# Patient Record
Sex: FEMALE | Race: WHITE | NOT HISPANIC OR LATINO | URBAN - NONMETROPOLITAN AREA
[De-identification: names, ages, dates, MRNs, and addresses within clinical notes are randomized per-mention and may not be internally consistent; named-entity substitution may affect disease eponyms.]

---

## 2019-03-18 ENCOUNTER — NURSE TRIAGE (OUTPATIENT)
Dept: CALL CENTER | Facility: HOSPITAL | Age: 29
End: 2019-03-18

## 2019-03-19 NOTE — TELEPHONE ENCOUNTER
"    Reason for Disposition  • Toothache present < 24 hours    Additional Information  • Negative: Shock suspected (e.g., cold/pale/clammy skin, too weak to stand, low BP, rapid pulse)  • Negative: [1] Similar pain previously AND [2] it was from \"heart attack\"  • Negative: [1] Similar pain previously AND [2] it was from \"angina\" AND [3] not relieved by nitroglycerin  • Negative: Sounds like a life-threatening emergency to the triager  • Negative: Chest pain  • Negative: Toothache followed tooth injury  • Negative: Toothache or mouth pain after tooth extraction  • Negative: Canker sore (i.e., aphthous ulcer)  • Negative: Tongue is very swollen and tender  • Negative: [1] Face is swollen AND [2] fever  • Negative: Patient sounds very sick or weak to the triager  • Negative: [1] SEVERE pain (e.g., excruciating, unable to do any normal activities) AND [2] not improved 2 hours after pain medicine  • Negative: Face is very swollen  • Negative: Fever  • Negative: [1] Face is swollen AND [2] no fever  • Negative: Toothache present > 24 hours  • Negative: [1] Toothache AND [2] intermittent AND [3] brought on by hot or cold liquids  • Negative: Brown cavity visible in the painful tooth  • Negative: Cracked or chipped tooth  • Negative: Red or yellow lump present at the gum line of the painful tooth  • Negative: Lost crown  • Negative: Lost filling  • Negative: Broken braces wire or end of braces wire is jabbing into gum, cheek, or tongue    Answer Assessment - Initial Assessment Questions  1. LOCATION: \"Which tooth is hurting?\"  (e.g., right-side/left-side, upper/lower, front/back)      Molar (would not say which tooth)  2. ONSET: \"When did the toothache start?\"  (e.g., hours, days)       today  3. SEVERITY: \"How bad is the toothache?\"  (Scale 1-10; mild, moderate or severe)    - MILD (1-3): doesn't interfere with chewing     - MODERATE (4-7): interferes with chewing, interferes with normal activities, awakens from sleep      " "- SEVERE (8-10): unable to eat, unable to do any normal activities, excruciating pain         severe  4. SWELLING: \"Is there any visible swelling of your face?\"      denies  5. OTHER SYMPTOMS: \"Do you have any other symptoms?\" (e.g., fever)      Cold last  6. PREGNANCY: \"Is there any chance you are pregnant?\" \"When was your last menstrual period?\"      denies    Protocols used: TOOTHACHE-ADULT-AH      "

## 2019-04-03 ENCOUNTER — NURSE TRIAGE (OUTPATIENT)
Dept: CALL CENTER | Facility: HOSPITAL | Age: 29
End: 2019-04-03

## 2019-04-04 NOTE — TELEPHONE ENCOUNTER
"Caller states she has sore throat, cough and is also light headed and weak. She is reporting feeling \"very bad\". She states she has attempted everything to feel better and it's just not working. She states she has not way of taking her temperature but she is feeling warm. Advised per guideline.     Reason for Disposition  • Patient sounds very sick or weak to the triager    Additional Information  • Negative: Severe difficulty breathing (e.g., struggling for each breath, speaks in single words, stridor)  • Negative: Sounds like a life-threatening emergency to the triager  • Negative: [1] Diagnosed strep throat AND [2] taking antibiotic AND [3] symptoms continue  • Negative: Throat culture results, call about  • Negative: Productive cough is main symptom  • Negative: Non-productive cough is main symptom  • Negative: Hoarseness is main symptom  • Negative: Runny nose is main symptom  • Negative: [1] Drooling or spitting out saliva (because can't swallow) AND [2] normal breathing  • Negative: Unable to open mouth completely  • Negative: [1] Difficulty breathing AND [2] not severe  • Negative: Fever > 104 F (40 C)  • Negative: [1] Refuses to drink anything AND [2] for > 12 hours  • Negative: [1] Drinking very little AND [2] dehydration suspected (e.g., no urine > 12 hours, very dry mouth, very lightheaded)    Answer Assessment - Initial Assessment Questions  1. ONSET: \"When did the throat start hurting?\" (Hours or days ago)       Last night   2. SEVERITY: \"How bad is the sore throat?\" (Scale 1-10; mild, moderate or severe)    - MILD (1-3):  doesn't interfere with eating or normal activities    - MODERATE (4-7): interferes with eating some solids and normal activities    - SEVERE (8-10):  excruciating pain, interferes with most normal activities    - SEVERE DYSPHAGIA: can't swallow liquids, drooling      Moderate pain 5  3. STREP EXPOSURE: \"Has there been any exposure to strep within the past week?\" If so, ask: \"What " "type of contact occurred?\"        Unsure   4.  VIRAL SYMPTOMS: \"Are there any symptoms of a cold, such as a runny nose, cough, hoarse voice or red eyes?\"       Cough, hoarse voice  5. FEVER: \"Do you have a fever?\" If so, ask: \"What is your temperature, how was it measured, and when did it start?\"      \"I've been feeling warm\".   6. PUS ON THE TONSILS: \"Is there pus on the tonsils in the back of your throat?\"      Not sure   7. OTHER SYMPTOMS: \"Do you have any other symptoms?\" (e.g., difficulty breathing, headache, rash)       Headache, light headed  8. PREGNANCY: \"Is there any chance you are pregnant?\" \"When was your last menstrual period?\"     Denies    Protocols used: SORE THROAT-ADULT-AH      "

## 2019-09-04 ENCOUNTER — NURSE TRIAGE (OUTPATIENT)
Dept: CALL CENTER | Facility: HOSPITAL | Age: 29
End: 2019-09-04

## 2019-09-04 NOTE — TELEPHONE ENCOUNTER
"Abdominal pain    Reason for Disposition  • [1] MILD-MODERATE pain AND [2] constant AND [3] present > 2 hours    Additional Information  • Negative: Shock suspected (e.g., cold/pale/clammy skin, too weak to stand, low BP, rapid pulse)  • Negative: Difficult to awaken or acting confused (e.g., disoriented, slurred speech)  • Negative: Passed out (i.e., lost consciousness, collapsed and was not responding)  • Negative: Sounds like a life-threatening emergency to the triager  • Negative: Chest pain  • Negative: Pain is mainly in upper abdomen  (if needed ask: \"is it mainly above the belly button?\")  • Negative: Followed an abdomen (stomach) injury  • Negative: [1] Abdominal pain AND [2] pregnant < 20 weeks  • Negative: [1] Abdominal pain AND [2] pregnant > 20 weeks  • Negative: [1] Abdominal pain AND [2] postpartum < 1 month since delivery  • Negative: [1] SEVERE pain (e.g., excruciating) AND [2] present > 1 hour  • Negative: [1] SEVERE pain AND [2] age > 60  • Negative: [1] Vomiting AND [2] contains red blood or black (\"coffee ground\") material  (Exception: few red streaks in vomit that only happened once)  • Negative: Blood in bowel movements   (Exception: blood on surface of BM with constipation)  • Negative: Black or tarry bowel movements  (Exception: chronic-unchanged  black-grey bowel movements AND is taking iron pills or Pepto-bismol)  • Negative: Patient sounds very sick or weak to the triager    Answer Assessment - Initial Assessment Questions  1. LOCATION: \"Where does it hurt?\"       Across  The middle of lower stomach  2. RADIATION: \"Does the pain shoot anywhere else?\" (e.g., chest, back)      no  3. ONSET: \"When did the pain begin?\" (e.g., minutes, hours or days ago)       3 days   4. SUDDEN: \"Gradual or sudden onset?\"      sudden  5. PATTERN \"Does the pain come and go, or is it constant?\"     - If constant: \"Is it getting better, staying the same, or worsening?\"       (Note: Constant means the pain never " "goes away completely; most serious pain is constant and it progresses)      - If intermittent: \"How long does it last?\" \"Do you have pain now?\"      (Note: Intermittent means the pain goes away completely between bouts)       Gets better with bowel movements  6. SEVERITY: \"How bad is the pain?\"  (e.g., Scale 1-10; mild, moderate, or severe)    - MILD (1-3): doesn't interfere with normal activities, abdomen soft and not tender to touch     - MODERATE (4-7): interferes with normal activities or awakens from sleep, tender to touch     - SEVERE (8-10): excruciating pain, doubled over, unable to do any normal activities       5  7. RECURRENT SYMPTOM: \"Have you ever had this type of abdominal pain before?\" If so, ask: \"When was the last time?\" and \"What happened that time?\"       no  8. CAUSE: \"What do you think is causing the abdominal pain?\"       stress  9. RELIEVING/AGGRAVATING FACTORS: \"What makes it better or worse?\" (e.g., movement, antacids, bowel movement)      nothing  10. OTHER SYMPTOMS: \"Has there been any vomiting, diarrhea, constipation, or urine problems?\"       Feels like going to vomit,   11. PREGNANCY: \"Is there any chance you are pregnant?\" \"When was your last menstrual period?\"        no    Protocols used: ABDOMINAL PAIN - FEMALE-ADULT-AH      "

## 2019-12-01 ENCOUNTER — NURSE TRIAGE (OUTPATIENT)
Dept: CALL CENTER | Facility: HOSPITAL | Age: 29
End: 2019-12-01

## 2019-12-01 NOTE — TELEPHONE ENCOUNTER
Reason for Disposition  • [1] SEVERE pain (e.g., excruciating) AND [2] not improved 2 hours after pain medicine/ice packs    Additional Information  • Negative: Dangerous mechanism of injury (e.g., MVA, contact sports, trampoline, diving, fall > 10 feet or 3 meters)  (Exception: back pain began > 1 hour after injury)  • Negative: [1] Weakness (i.e., paralysis, loss of muscle strength) of the leg(s) or foot AND [2] sudden onset after back injury  • Negative: [1] Numbness (i.e., loss of sensation) of the leg(s) or foot AND [2] sudden onset after back injury  • Negative: [1] Major bleeding (e.g., actively dripping or spurting) AND [2] can't be stopped  • Negative: Bullet wound, knife wound, or other penetrating object  • Negative: Shock suspected (e.g., cold/pale/clammy skin, too weak to stand, low BP, rapid pulse)  • Negative: Sounds like a life-threatening emergency to the triager  • Negative: [1] Injuries at more than 1 site AND [2] unsure which guideline to use  • Negative: Injury to the neck  • Negative: Injury to the tailbone  • Negative: Back pain not from an injury  • Negative: Back pain from overuse (work, exercise, gardening) OR from twisting, lifting, or bending injury  • Negative: [1] SEVERE PAIN in kidney area (flank) AND [2] follows direct blow to that site  • Negative: Blood in urine (red, pink, or tea-colored)  • Negative: [1] Unable to urinate (or only a few drops) > 4 hours AND     [2] bladder feels very full (e.g., palpable bladder or strong urge to urinate)  • Negative: [1] Urinary or bowel incontinence (i.e., loss of bladder or bowel control) AND [2] new onset  • Negative: Numbness (loss of sensation) in groin or rectal area  • Negative: Skin is split open or gaping  (or length > 1/2 inch or 12 mm)  • Negative: Puncture wound of back  • Negative: [1] Bleeding AND [2] won't stop after 10 minutes of direct pressure (using correct technique)  • Negative: Sounds like a serious injury to the  "triager  • Negative: Weakness of a leg or foot (e.g., unable to bear weight, dragging foot)  • Negative: Numbness of a leg or foot (i.e.., loss of sensation)    Answer Assessment - Initial Assessment Questions  1. MECHANISM: \"How did the injury happen?\" (Consider the possibility of domestic violence or elder abuse)      Had a fall   2. ONSET: \"When did the injury happen?\" (Minutes or hours ago)      Few days ago   3. LOCATION: \"What part of the back is injured?\"      Low back   4. SEVERITY: \"Can you move the back normally?\"      Yes; just has trouble getting out of bed   5. PAIN: \"Is there any pain?\" If so, ask: \"How bad is the pain?\"   (Scale 1-10; or mild, moderate, severe)      Yes; severe   6. CORD SYMPTOMS: Any weakness or numbness of the arms or legs?\"      No   7. SIZE: For cuts, bruises, or swelling, ask: \"How large is it?\" (e.g., inches or centimeters)      None   8. TETANUS: For any breaks in the skin, ask: \"When was the last tetanus booster?\"      No   9. OTHER SYMPTOMS: \"Do you have any other symptoms?\" (e.g., abdominal pain, blood in urine)      Denies any other c/o. She just has pain getting out of bed   10. PREGNANCY: \"Is there any chance you are pregnant?\" \"When was your last menstrual period?\"        No    Protocols used: BACK INJURY-ADULT-      "

## 2022-11-28 ENCOUNTER — NURSE TRIAGE (OUTPATIENT)
Dept: CALL CENTER | Facility: HOSPITAL | Age: 32
End: 2022-11-28

## 2022-11-28 NOTE — TELEPHONE ENCOUNTER
"    Reason for Disposition  • [1] SEVERE mouth pain (e.g., excruciating) AND [2] not improved after 2 hours of pain medicine    Additional Information  • Negative: SEVERE difficulty breathing (e.g., struggling for each breath, speaks in single words, stridor)  • Negative: Sounds like a life-threatening emergency to the triager  • Negative: Followed a tooth (or teeth) injury  • Negative: Followed a mouth injury  • Negative: Throat is painful  • Negative: Canker sore suspected (i.e., aphthous ulcer) in the mouth  • Negative: Cold sore suspected (i.e., fever blister sore) on the outer lip  • Negative: Tooth is painful or swelling around a tooth  • Negative: [1] Drooling or spitting out saliva (because can't swallow) AND [2] new-onset  • Negative: Electrical burn of mouth  • Negative: Chemical burn of mouth  • Negative: Tongue is very swollen and tender  • Negative: [1] Difficulty breathing AND [2] not severe  • Negative: [1] Face is swollen AND [2] fever  • Negative: [1] Drinking very little AND [2] dehydration suspected (e.g., no urine > 12 hours, very dry mouth, very lightheaded)  • Negative: Patient sounds very sick or weak to the triager    Answer Assessment - Initial Assessment Questions  1. ONSET: \"When did the mouth start hurting?\" (e.g., hours or days ago)       Several days  2. SEVERITY: \"How bad is the pain?\" (Scale 1-10; mild, moderate or severe)    - MILD (1-3):  doesn't interfere with eating or normal activities    - MODERATE (4-7): interferes with eating some solids and normal activities    - SEVERE (8-10):  excruciating pain, interferes with most normal activities    - SEVERE DYSPHAGIA: can't swallow liquids, drooling      10  3. SORES: \"Are there any sores or ulcers in the mouth?\" If Yes, ask: \"What part of the mouth are the sores in?\"      \"gums are sore\"  4. FEVER: \"Do you have a fever?\" If Yes, ask: \"What is your temperature, how was it measured, and when did it start?\"      Denies  5. CAUSE: \"What " "do you think is causing the mouth pain?\"      Gingivitis  6. OTHER SYMPTOMS: \"Do you have any other symptoms?\" (e.g., difficulty breathing)      Denies    Protocols used: MOUTH PAIN-ADULT-AH      "

## 2024-06-08 ENCOUNTER — NURSE TRIAGE (OUTPATIENT)
Dept: CALL CENTER | Facility: HOSPITAL | Age: 34
End: 2024-06-08

## 2024-06-09 NOTE — TELEPHONE ENCOUNTER
"Caller states gum/toothache in back of her mouth. Caller has been doing salt water gargles and taking OTC medication. Caller denies fever or face swelling. Caller denies any difficulty swallowing and states maybe some swelling in the back on gum. Advised per guideline.             Reason for Disposition   [1] SEVERE pain (e.g., excruciating, unable to eat, unable to do any normal activities) AND [2] not improved 2 hours after pain medicine    Additional Information   Negative: Shock suspected (e.g., cold/pale/clammy skin, too weak to stand, low BP, rapid pulse)   Negative: [1] Similar pain previously AND [2] it was from \"heart attack\"   Negative: [1] Similar pain previously AND [2] it was from \"angina\" AND [3] not relieved by nitroglycerin   Negative: Sounds like a life-threatening emergency to the triager   Negative: Chest pain   Negative: Toothache followed tooth injury   Negative: Toothache or mouth pain after tooth extraction   Negative: Canker sore (i.e., aphthous ulcer)   Negative: Tongue is very swollen and tender   Negative: [1] Face is swollen AND [2] fever   Negative: Patient sounds very sick or weak to the triager    Answer Assessment - Initial Assessment Questions  1. LOCATION: \"Which tooth is hurting?\"  (e.g., right-side/left-side, upper/lower, front/back)      Bottom right side to the back   2. ONSET: \"When did the toothache start?\"  (e.g., hours, days)       Tonight   3. SEVERITY: \"How bad is the toothache?\"  (Scale 1-10; mild, moderate or severe)    - MILD (1-3): doesn't interfere with chewing     - MODERATE (4-7): interferes with chewing, interferes with normal activities, awakens from sleep      - SEVERE (8-10): unable to eat, unable to do any normal activities, excruciating pain          7-8  4. SWELLING: \"Is there any visible swelling of your face?\"      Denies   5. OTHER SYMPTOMS: \"Do you have any other symptoms?\" (e.g., fever)      Some bleeding not sure about fever   6. PREGNANCY: \"Is there " "any chance you are pregnant?\" \"When was your last menstrual period?\"      Denies    Protocols used: Toothache-ADULT-AH    "

## 2025-06-22 ENCOUNTER — NURSE TRIAGE (OUTPATIENT)
Dept: CALL CENTER | Facility: HOSPITAL | Age: 35
End: 2025-06-22

## 2025-06-23 NOTE — TELEPHONE ENCOUNTER
I just sat up and the room is spinning. No AC at home.  Pt has had dizziness since the am on and off. Drinking water and fans on pt. Pt has a slight headache and not having vision issues.Pt [place] of [place] and she has no one she can stay with that has AC. This RN discussing going to ED because she doesn't have a PCP,  and if someone can take pt. Pt states she doesn't want to call 911. She will call her neighbor to take her. Pt has been drinking water and trying to stay in front of fans. Pt has no AC or heat in home.          Reason for Disposition   [1] MODERATE dizziness (e.g., vertigo; feels very unsteady, interferes with normal activities) AND [2] has NOT been evaluated by doctor (or NP/PA) for this    Additional Information   Negative: [1] Weakness (i.e., paralysis, loss of muscle strength) of the face, arm or leg on one side of the body AND [2] sudden onset AND [3] present now   Negative: [1] Numbness (i.e., loss of sensation) of the face, arm or leg on one side of the body AND [2] sudden onset AND [3] present now   Negative: [1] Loss of speech or garbled speech AND [2] sudden onset AND [3] present now   Negative: Difficult to awaken or acting confused (e.g., disoriented, slurred speech)   Negative: Sounds like a life-threatening emergency to the triager   Negative: Followed a head injury   Negative: Followed an ear injury   Negative: Localized weakness or numbness is main symptom   Negative: Dizziness relates to riding in a car, going to an amusement park, etc.   Negative: [1] Dizziness is main symptom AND [2] NO spinning sensation (i.e., vertigo)   Negative: SEVERE dizziness (vertigo) (e.g., unable to walk without assistance)   Negative: Severe headache (e.g., excruciating)   (Exception: Similar to previous migraines.)   Negative: [1] Dizziness (vertigo) present now AND [2] one or more STROKE RISK FACTORS (i.e., hypertension, diabetes, prior stroke/TIA, heart attack)  (Exception: Prior doctor or  "NP/PA evaluation for this AND no different/worse than usual.)   Negative: [1] Dizziness (vertigo) present now AND [2] age > 59   (Exception: Prior doctor or NP/PA evaluation for this AND no different/worse than usual.)   Negative: [1] Weakness (i.e., paralysis, loss of muscle strength) of the face, arm / hand, or leg / foot on one side of the body AND [2] sudden onset AND [3] brief (now gone)   Negative: [1] Numbness (i.e., loss of sensation) of the face, arm / hand, or leg / foot on one side of the body AND [2] sudden onset AND [3] brief (now gone)   Negative: [1] Loss of speech or garbled speech AND [2] sudden onset AND [3] brief (now gone)   Negative: Loss of vision or double vision  (Exception: Similar to previous migraines.)   Negative: Patient sounds very sick or weak to the triager   Negative: Taking a medicine that could cause dizziness (e.g., phenytoin [Dilantin], carbamazepine [Tegretol], primidone [Mysoline])    Answer Assessment - Initial Assessment Questions  1. DESCRIPTION: \"Describe your dizziness.\"      I just sat up and the room is spinning. No AC at home.  2. VERTIGO: \"Do you feel like either you or the room is spinning or tilting?\"       Spinning  3. LIGHTHEADED: \"Do you feel lightheaded?\" (e.g., somewhat faint, woozy, weak upon standing)      Woozy  4. SEVERITY: \"How bad is it?\"  \"Can you walk?\"    - MILD: Feels slightly dizzy and unsteady, but is walking normally.    - MODERATE: Feels unsteady when walking, but not falling; interferes with normal activities (e.g., school, work).    - SEVERE: Unable to walk without falling, or requires assistance to walk without falling.      Pt can get up and walk around, dizziness comes and goes.  5. ONSET:  \"When did the dizziness begin?\"      On and off since this am.  6. AGGRAVATING FACTORS: \"Does anything make it worse?\" (e.g., standing, change in head position)      No  7. CAUSE: \"What do you think is causing the dizziness?\"      No AC in house  8. " "RECURRENT SYMPTOM: \"Have you had dizziness before?\" If Yes, ask: \"When was the last time?\" \"What happened that time?\"      no  9. OTHER SYMPTOMS: \"Do you have any other symptoms?\" (e.g., headache, weakness, numbness, vomiting, earache)      no  10. PREGNANCY: \"Is there any chance you are pregnant?\" \"When was your last menstrual period?\"        no    Protocols used: Dizziness - Vertigo-ADULT-    "